# Patient Record
Sex: FEMALE | Race: WHITE | Employment: FULL TIME
[De-identification: names, ages, dates, MRNs, and addresses within clinical notes are randomized per-mention and may not be internally consistent; named-entity substitution may affect disease eponyms.]

---

## 2024-07-31 ENCOUNTER — OFFICE VISIT (OUTPATIENT)
Dept: FAMILY MEDICINE CLINIC | Facility: CLINIC | Age: 54
End: 2024-07-31

## 2024-07-31 VITALS
SYSTOLIC BLOOD PRESSURE: 132 MMHG | TEMPERATURE: 97.8 F | WEIGHT: 194 LBS | BODY MASS INDEX: 32.32 KG/M2 | OXYGEN SATURATION: 99 % | DIASTOLIC BLOOD PRESSURE: 80 MMHG | HEIGHT: 65 IN | HEART RATE: 57 BPM

## 2024-07-31 DIAGNOSIS — Z97.5 PRESENCE OF IUD: ICD-10-CM

## 2024-07-31 DIAGNOSIS — Z76.89 ENCOUNTER TO ESTABLISH CARE: Primary | ICD-10-CM

## 2024-07-31 DIAGNOSIS — M54.42 CHRONIC LEFT-SIDED LOW BACK PAIN WITH LEFT-SIDED SCIATICA: ICD-10-CM

## 2024-07-31 DIAGNOSIS — M25.532 CHRONIC PAIN OF BOTH WRISTS: ICD-10-CM

## 2024-07-31 DIAGNOSIS — Z12.31 ENCOUNTER FOR SCREENING MAMMOGRAM FOR MALIGNANT NEOPLASM OF BREAST: ICD-10-CM

## 2024-07-31 DIAGNOSIS — N95.1 MENOPAUSAL VASOMOTOR SYNDROME: ICD-10-CM

## 2024-07-31 DIAGNOSIS — G89.29 CHRONIC PAIN OF BOTH WRISTS: ICD-10-CM

## 2024-07-31 DIAGNOSIS — Z87.39 PERSONAL HISTORY OF GOUT: ICD-10-CM

## 2024-07-31 DIAGNOSIS — L50.5 CHOLINERGIC URTICARIA: ICD-10-CM

## 2024-07-31 DIAGNOSIS — K60.2 ANAL FISSURE: ICD-10-CM

## 2024-07-31 DIAGNOSIS — K21.9 GASTROESOPHAGEAL REFLUX DISEASE WITHOUT ESOPHAGITIS: ICD-10-CM

## 2024-07-31 DIAGNOSIS — G89.29 CHRONIC LEFT-SIDED LOW BACK PAIN WITH LEFT-SIDED SCIATICA: ICD-10-CM

## 2024-07-31 DIAGNOSIS — Z12.11 ENCOUNTER FOR SCREENING FOR MALIGNANT NEOPLASM OF COLON: ICD-10-CM

## 2024-07-31 DIAGNOSIS — Z87.442 PERSONAL HISTORY OF RENAL CALCULI: ICD-10-CM

## 2024-07-31 DIAGNOSIS — M54.2 NECK PAIN, CHRONIC: ICD-10-CM

## 2024-07-31 DIAGNOSIS — G89.29 NECK PAIN, CHRONIC: ICD-10-CM

## 2024-07-31 DIAGNOSIS — M25.531 CHRONIC PAIN OF BOTH WRISTS: ICD-10-CM

## 2024-07-31 DIAGNOSIS — L71.9 ROSACEA: ICD-10-CM

## 2024-07-31 SDOH — ECONOMIC STABILITY: INCOME INSECURITY: HOW HARD IS IT FOR YOU TO PAY FOR THE VERY BASICS LIKE FOOD, HOUSING, MEDICAL CARE, AND HEATING?: NOT HARD AT ALL

## 2024-07-31 SDOH — ECONOMIC STABILITY: HOUSING INSECURITY
IN THE LAST 12 MONTHS, WAS THERE A TIME WHEN YOU DID NOT HAVE A STEADY PLACE TO SLEEP OR SLEPT IN A SHELTER (INCLUDING NOW)?: NO

## 2024-07-31 SDOH — ECONOMIC STABILITY: FOOD INSECURITY: WITHIN THE PAST 12 MONTHS, YOU WORRIED THAT YOUR FOOD WOULD RUN OUT BEFORE YOU GOT MONEY TO BUY MORE.: NEVER TRUE

## 2024-07-31 SDOH — ECONOMIC STABILITY: FOOD INSECURITY: WITHIN THE PAST 12 MONTHS, THE FOOD YOU BOUGHT JUST DIDN'T LAST AND YOU DIDN'T HAVE MONEY TO GET MORE.: NEVER TRUE

## 2024-07-31 ASSESSMENT — PATIENT HEALTH QUESTIONNAIRE - PHQ9
2. FEELING DOWN, DEPRESSED OR HOPELESS: NOT AT ALL
SUM OF ALL RESPONSES TO PHQ QUESTIONS 1-9: 0
SUM OF ALL RESPONSES TO PHQ9 QUESTIONS 1 & 2: 0
1. LITTLE INTEREST OR PLEASURE IN DOING THINGS: NOT AT ALL

## 2024-07-31 NOTE — PROGRESS NOTES
per day to prevent kidney stone or gout formation.  Discussed if recurrence of kidney stone happens, can obtain analysis of stone if she happens to strain and catch stone.  Patient instructed to take NSAIDs if pain of kidney stone or gout recurs, follow-up if severe pain occurs.  Limit foods in diet high in purine.  Decrease intake of alcohol, no more than 1 EtOH drink per day.    Patient declines treatment for GERD.  Instructed to limit intake of alcohol.  Limit foods that cause acid reflux.  Avoid eating within 2 hours of bedtime.  Use OTC Pepcid as needed.    Negative Phalen's test in office today, notify patient not indicative of carpal tunnel at this time.  Try wrist braces for pain and if no relief or worsening symptoms can send to Ortho.  Take NSAIDs or Tylenol for the pain as needed.  Declines treatment of neck and back pain at this time.  Report worsening symptoms.    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  Patient to let me know if she wants dermatology referral in the future.    Discussed prevention of bleeding from anal fissure with increasing fiber and fluids in diet.  Use stool softeners as needed.  Report worsening symptoms.    Referral placed to general surgery for initial screening colonoscopy.    Mammogram orders placed, patient encouraged to schedule initial screening mammogram.  Encouraged patient to wear cotton clothing, cotton sheets, and to keep AC cool to manage menopausal vasomotor symptoms.  Patient instructed to begin taking 600 IU vitamin D3 daily to prevent osteoporosis in addition to regular intake of calcium in diet.  Encourage patient to begin exercising with a goal of 150 minutes moderate intensity exercise per week.  Begin weightbearing exercise 2 to 3 days/week as tolerated to prevent bone density loss.    Patient to follow-up in the next month for complete physical exam, annual labs, plus Pap smear since cervical cancer screening

## 2024-08-02 PROBLEM — G89.29 CHRONIC PAIN OF BOTH WRISTS: Status: ACTIVE | Noted: 2024-08-02

## 2024-08-02 PROBLEM — Z97.5 PRESENCE OF IUD: Status: ACTIVE | Noted: 2024-08-02

## 2024-08-02 PROBLEM — K21.9 GASTROESOPHAGEAL REFLUX DISEASE WITHOUT ESOPHAGITIS: Status: ACTIVE | Noted: 2024-08-02

## 2024-08-02 PROBLEM — L71.9 ROSACEA: Status: ACTIVE | Noted: 2024-08-02

## 2024-08-02 PROBLEM — M54.42 CHRONIC LEFT-SIDED LOW BACK PAIN WITH LEFT-SIDED SCIATICA: Status: ACTIVE | Noted: 2024-08-02

## 2024-08-02 PROBLEM — N95.1 MENOPAUSAL VASOMOTOR SYNDROME: Status: ACTIVE | Noted: 2024-08-02

## 2024-08-02 PROBLEM — M25.532 CHRONIC PAIN OF BOTH WRISTS: Status: ACTIVE | Noted: 2024-08-02

## 2024-08-02 PROBLEM — G89.29 CHRONIC LEFT-SIDED LOW BACK PAIN WITH LEFT-SIDED SCIATICA: Status: ACTIVE | Noted: 2024-08-02

## 2024-08-02 PROBLEM — M54.2 NECK PAIN, CHRONIC: Status: ACTIVE | Noted: 2024-08-02

## 2024-08-02 PROBLEM — G89.29 NECK PAIN, CHRONIC: Status: ACTIVE | Noted: 2024-08-02

## 2024-08-02 PROBLEM — K60.2 ANAL FISSURE: Status: ACTIVE | Noted: 2024-08-02

## 2024-08-02 PROBLEM — Z87.442 PERSONAL HISTORY OF RENAL CALCULI: Status: ACTIVE | Noted: 2024-08-02

## 2024-08-02 PROBLEM — M25.531 CHRONIC PAIN OF BOTH WRISTS: Status: ACTIVE | Noted: 2024-08-02

## 2024-08-02 PROBLEM — Z87.39 PERSONAL HISTORY OF GOUT: Status: ACTIVE | Noted: 2024-08-02

## 2024-08-02 PROBLEM — L50.5 CHOLINERGIC URTICARIA: Status: ACTIVE | Noted: 2024-08-02

## 2024-08-02 ASSESSMENT — ENCOUNTER SYMPTOMS
DIARRHEA: 0
SINUS PAIN: 0
NAUSEA: 0
SHORTNESS OF BREATH: 0
RHINORRHEA: 0
COUGH: 0
RECTAL PAIN: 0
SORE THROAT: 0
WHEEZING: 0
EYE PAIN: 0
ANAL BLEEDING: 1
COLOR CHANGE: 0
EYE DISCHARGE: 0
CHEST TIGHTNESS: 0
ABDOMINAL PAIN: 0
CONSTIPATION: 0
BLOOD IN STOOL: 0
BACK PAIN: 1
VOMITING: 0
PHOTOPHOBIA: 0

## 2024-08-07 ENCOUNTER — TELEPHONE (OUTPATIENT)
Age: 54
End: 2024-08-07

## 2024-08-07 ENCOUNTER — PREP FOR PROCEDURE (OUTPATIENT)
Age: 54
End: 2024-08-07

## 2024-08-07 DIAGNOSIS — Z12.11 ENCOUNTER FOR SCREENING COLONOSCOPY: Primary | ICD-10-CM

## 2024-08-07 DIAGNOSIS — Z00.00 ANNUAL PHYSICAL EXAM: Primary | ICD-10-CM

## 2024-08-07 DIAGNOSIS — Z12.11 ENCOUNTER FOR SCREENING COLONOSCOPY: ICD-10-CM

## 2024-08-07 RX ORDER — SODIUM, POTASSIUM,MAG SULFATES 17.5-3.13G
1 SOLUTION, RECONSTITUTED, ORAL ORAL ONCE
Qty: 1 EACH | Refills: 0 | Status: SHIPPED | OUTPATIENT
Start: 2024-08-07 | End: 2024-08-07

## 2024-08-07 RX ORDER — SODIUM CHLORIDE 9 MG/ML
25 INJECTION, SOLUTION INTRAVENOUS PRN
Status: CANCELLED | OUTPATIENT
Start: 2024-08-07

## 2024-08-07 RX ORDER — SODIUM CHLORIDE 0.9 % (FLUSH) 0.9 %
5-40 SYRINGE (ML) INJECTION EVERY 12 HOURS SCHEDULED
Status: CANCELLED | OUTPATIENT
Start: 2024-08-07

## 2024-08-07 RX ORDER — SODIUM CHLORIDE 0.9 % (FLUSH) 0.9 %
5-40 SYRINGE (ML) INJECTION PRN
Status: CANCELLED | OUTPATIENT
Start: 2024-08-07

## 2024-08-07 NOTE — TELEPHONE ENCOUNTER
The patient returned call to schedule a direct colonoscopy. Scheduled 10/11/2024 with Dr. Prajapati at [facility]. Suprep instructions emailed to the patient at [email or web address]. Sent a message to the nurse to have Suprep sent to [facility] [address].

## 2024-09-06 PROBLEM — Z12.11 ENCOUNTER FOR SCREENING COLONOSCOPY: Status: RESOLVED | Noted: 2024-08-07 | Resolved: 2024-09-06

## 2024-09-25 ENCOUNTER — TELEPHONE (OUTPATIENT)
Age: 54
End: 2024-09-25